# Patient Record
Sex: FEMALE | Race: BLACK OR AFRICAN AMERICAN | NOT HISPANIC OR LATINO | URBAN - METROPOLITAN AREA
[De-identification: names, ages, dates, MRNs, and addresses within clinical notes are randomized per-mention and may not be internally consistent; named-entity substitution may affect disease eponyms.]

---

## 2017-09-27 ENCOUNTER — EMERGENCY (EMERGENCY)
Facility: HOSPITAL | Age: 27
LOS: 1 days | Discharge: HOME | End: 2017-09-27
Attending: EMERGENCY MEDICINE | Admitting: EMERGENCY MEDICINE
Payer: COMMERCIAL

## 2017-09-27 VITALS
HEART RATE: 87 BPM | DIASTOLIC BLOOD PRESSURE: 73 MMHG | TEMPERATURE: 98 F | OXYGEN SATURATION: 100 % | SYSTOLIC BLOOD PRESSURE: 134 MMHG

## 2017-09-27 LAB
ALBUMIN SERPL ELPH-MCNC: 4.2 G/DL — SIGNIFICANT CHANGE UP (ref 3.3–5)
ALP SERPL-CCNC: 62 U/L — SIGNIFICANT CHANGE UP (ref 40–120)
ALT FLD-CCNC: 10 U/L — SIGNIFICANT CHANGE UP (ref 4–33)
AST SERPL-CCNC: 16 U/L — SIGNIFICANT CHANGE UP (ref 4–32)
BASOPHILS # BLD AUTO: 0.04 K/UL — SIGNIFICANT CHANGE UP (ref 0–0.2)
BASOPHILS NFR BLD AUTO: 0.4 % — SIGNIFICANT CHANGE UP (ref 0–2)
BILIRUB SERPL-MCNC: 0.3 MG/DL — SIGNIFICANT CHANGE UP (ref 0.2–1.2)
BUN SERPL-MCNC: 9 MG/DL — SIGNIFICANT CHANGE UP (ref 7–23)
CALCIUM SERPL-MCNC: 8.6 MG/DL — SIGNIFICANT CHANGE UP (ref 8.4–10.5)
CHLORIDE SERPL-SCNC: 103 MMOL/L — SIGNIFICANT CHANGE UP (ref 98–107)
CO2 SERPL-SCNC: 23 MMOL/L — SIGNIFICANT CHANGE UP (ref 22–31)
CREAT SERPL-MCNC: 0.81 MG/DL — SIGNIFICANT CHANGE UP (ref 0.5–1.3)
EOSINOPHIL # BLD AUTO: 0.28 K/UL — SIGNIFICANT CHANGE UP (ref 0–0.5)
EOSINOPHIL NFR BLD AUTO: 3 % — SIGNIFICANT CHANGE UP (ref 0–6)
GLUCOSE SERPL-MCNC: 91 MG/DL — SIGNIFICANT CHANGE UP (ref 70–99)
HCT VFR BLD CALC: 36.8 % — SIGNIFICANT CHANGE UP (ref 34.5–45)
HGB BLD-MCNC: 11.7 G/DL — SIGNIFICANT CHANGE UP (ref 11.5–15.5)
IMM GRANULOCYTES # BLD AUTO: 0.03 # — SIGNIFICANT CHANGE UP
IMM GRANULOCYTES NFR BLD AUTO: 0.3 % — SIGNIFICANT CHANGE UP (ref 0–1.5)
INR BLD: 0.99 — SIGNIFICANT CHANGE UP (ref 0.88–1.17)
LYMPHOCYTES # BLD AUTO: 3.41 K/UL — HIGH (ref 1–3.3)
LYMPHOCYTES # BLD AUTO: 36.5 % — SIGNIFICANT CHANGE UP (ref 13–44)
MCHC RBC-ENTMCNC: 25.8 PG — LOW (ref 27–34)
MCHC RBC-ENTMCNC: 31.8 % — LOW (ref 32–36)
MCV RBC AUTO: 81.2 FL — SIGNIFICANT CHANGE UP (ref 80–100)
MONOCYTES # BLD AUTO: 0.53 K/UL — SIGNIFICANT CHANGE UP (ref 0–0.9)
MONOCYTES NFR BLD AUTO: 5.7 % — SIGNIFICANT CHANGE UP (ref 2–14)
NEUTROPHILS # BLD AUTO: 5.06 K/UL — SIGNIFICANT CHANGE UP (ref 1.8–7.4)
NEUTROPHILS NFR BLD AUTO: 54.1 % — SIGNIFICANT CHANGE UP (ref 43–77)
NRBC # FLD: 0 — SIGNIFICANT CHANGE UP
PLATELET # BLD AUTO: 319 K/UL — SIGNIFICANT CHANGE UP (ref 150–400)
PMV BLD: 9.2 FL — SIGNIFICANT CHANGE UP (ref 7–13)
POTASSIUM SERPL-MCNC: 4.2 MMOL/L — SIGNIFICANT CHANGE UP (ref 3.5–5.3)
POTASSIUM SERPL-SCNC: 4.2 MMOL/L — SIGNIFICANT CHANGE UP (ref 3.5–5.3)
PROT SERPL-MCNC: 7.6 G/DL — SIGNIFICANT CHANGE UP (ref 6–8.3)
PROTHROM AB SERPL-ACNC: 11.1 SEC — SIGNIFICANT CHANGE UP (ref 9.8–13.1)
RBC # BLD: 4.53 M/UL — SIGNIFICANT CHANGE UP (ref 3.8–5.2)
RBC # FLD: 15.9 % — HIGH (ref 10.3–14.5)
SODIUM SERPL-SCNC: 138 MMOL/L — SIGNIFICANT CHANGE UP (ref 135–145)
WBC # BLD: 9.35 K/UL — SIGNIFICANT CHANGE UP (ref 3.8–10.5)
WBC # FLD AUTO: 9.35 K/UL — SIGNIFICANT CHANGE UP (ref 3.8–10.5)

## 2017-09-27 PROCEDURE — 71275 CT ANGIOGRAPHY CHEST: CPT | Mod: 26

## 2017-09-27 PROCEDURE — 99284 EMERGENCY DEPT VISIT MOD MDM: CPT | Mod: 25

## 2017-09-27 PROCEDURE — 93010 ELECTROCARDIOGRAM REPORT: CPT

## 2017-09-27 RX ORDER — KETOROLAC TROMETHAMINE 30 MG/ML
30 SYRINGE (ML) INJECTION ONCE
Qty: 0 | Refills: 0 | Status: DISCONTINUED | OUTPATIENT
Start: 2017-09-27 | End: 2017-09-27

## 2017-09-27 RX ADMIN — Medication 30 MILLIGRAM(S): at 23:00

## 2017-09-27 NOTE — ED PROVIDER NOTE - PMH
Asthma    Fibroids    Menorrhagia    Obesity    Pulmonary embolism  March 2014 -- treated with Xarelto--thought to be due to birth control  Seasonal allergies

## 2017-09-27 NOTE — ED PROVIDER NOTE - MEDICAL DECISION MAKING DETAILS
26 y/o female w/ pleuritic mid back pain, similar feeling to previous PE- labs, CTA chest, pain control

## 2017-09-27 NOTE — ED ADULT TRIAGE NOTE - CHIEF COMPLAINT QUOTE
Patient c/o pain in her back in two areas, the left mid back and right upper spine area. Ceci started yesterday. She has a h/o a PE in 2015.  Chest pain that comes and goes, denies any SOB.

## 2017-09-27 NOTE — ED PROVIDER NOTE - CROS ED ROS STATEMENT
Good fm.  Denies ctx, vb, lof.  Glucola and cbc on rtc. PTL prec given.  
all other ROS negative except as per HPI

## 2017-09-27 NOTE — ED ADULT NURSE NOTE - OBJECTIVE STATEMENT
pt c.o of having lower bacl pain that radiates to her chest. denies chest pain but admits to having HOUSER. no sob at the moment. iv started ,labs sent . pt nad.pt placed on cm.  will continue to monitor.

## 2017-09-27 NOTE — ED PROVIDER NOTE - OBJECTIVE STATEMENT
26 y/o female pmh unprovoked PE(2014, treated with AC for 1 year), c/o upper mid back pain x1 day. Pt admits to sharp upper mid back pain, worse with deep breathing. Pt admits to feeling similar to when she had PE in 2014. Pt denies chest pain, sob, abd pain, n/v/d, numbness, tingling, weakness, dizziness, syncope, fever or chills. Admits to recent flight to and from [place] x2 weeks ago. Denies leg swelling or calf pain/tenderness. Denies recent fall, trauma, injury, exercise or heavy lifting

## 2017-09-27 NOTE — ED PROVIDER NOTE - ATTENDING CONTRIBUTION TO CARE
26 y/o F with h/o PE (2015, completed full year of anticoagulation) here with 1 day of diffuse back pain.  Pt reports pain to right upper back that radiated to left side and around to front of chest.  Pain is worse with any movement and deep inspiration, similar in quality to previous PE.  No fever, chills, palpitations, abd pain, n/v, urinary sxs, leg pain or swelling.  Well appearing, lying comfortably in stretcher, awake and alert, nontoxic.  VSS.  Lungs cta bl.  Cards nl S1/S2, RRR, no MRG.  Abd soft ntnd.  No midline tenderness.  No pedal edema or calf tenderness.  Plan for labs, ucg, cta, r/o PE given hx of same in otherwise unprovoked state, low risk acs heart score 0, will obtain ekg, ce x 1 given 1 day of pain.

## 2018-01-26 ENCOUNTER — RESULT REVIEW (OUTPATIENT)
Age: 28
End: 2018-01-26

## 2020-09-28 ENCOUNTER — OUTPATIENT (OUTPATIENT)
Dept: OUTPATIENT SERVICES | Facility: HOSPITAL | Age: 30
LOS: 1 days | End: 2020-09-28

## 2020-09-28 VITALS
SYSTOLIC BLOOD PRESSURE: 110 MMHG | HEART RATE: 97 BPM | WEIGHT: 270.07 LBS | OXYGEN SATURATION: 98 % | TEMPERATURE: 99 F | DIASTOLIC BLOOD PRESSURE: 70 MMHG | HEIGHT: 68 IN | RESPIRATION RATE: 15 BRPM

## 2020-09-28 DIAGNOSIS — Z98.890 OTHER SPECIFIED POSTPROCEDURAL STATES: Chronic | ICD-10-CM

## 2020-09-28 DIAGNOSIS — D25.1 INTRAMURAL LEIOMYOMA OF UTERUS: ICD-10-CM

## 2020-09-28 DIAGNOSIS — N83.209 UNSPECIFIED OVARIAN CYST, UNSPECIFIED SIDE: ICD-10-CM

## 2020-09-28 DIAGNOSIS — I26.99 OTHER PULMONARY EMBOLISM WITHOUT ACUTE COR PULMONALE: ICD-10-CM

## 2020-09-28 LAB
BLD GP AB SCN SERPL QL: NEGATIVE — SIGNIFICANT CHANGE UP
HCT VFR BLD CALC: 38.8 % — SIGNIFICANT CHANGE UP (ref 34.5–45)
HGB BLD-MCNC: 12.3 G/DL — SIGNIFICANT CHANGE UP (ref 11.5–15.5)
MCHC RBC-ENTMCNC: 27.3 PG — SIGNIFICANT CHANGE UP (ref 27–34)
MCHC RBC-ENTMCNC: 31.7 % — LOW (ref 32–36)
MCV RBC AUTO: 86 FL — SIGNIFICANT CHANGE UP (ref 80–100)
NRBC # FLD: 0 K/UL — SIGNIFICANT CHANGE UP (ref 0–0)
PLATELET # BLD AUTO: 338 K/UL — SIGNIFICANT CHANGE UP (ref 150–400)
PMV BLD: 9.8 FL — SIGNIFICANT CHANGE UP (ref 7–13)
RBC # BLD: 4.51 M/UL — SIGNIFICANT CHANGE UP (ref 3.8–5.2)
RBC # FLD: 13.7 % — SIGNIFICANT CHANGE UP (ref 10.3–14.5)
RH IG SCN BLD-IMP: POSITIVE — SIGNIFICANT CHANGE UP
WBC # BLD: 6.18 K/UL — SIGNIFICANT CHANGE UP (ref 3.8–10.5)
WBC # FLD AUTO: 6.18 K/UL — SIGNIFICANT CHANGE UP (ref 3.8–10.5)

## 2020-09-28 RX ORDER — SODIUM CHLORIDE 9 MG/ML
1000 INJECTION, SOLUTION INTRAVENOUS
Refills: 0 | Status: DISCONTINUED | OUTPATIENT
Start: 2020-10-08 | End: 2020-10-09

## 2020-09-28 NOTE — H&P PST ADULT - NSICDXPASTMEDICALHX_GEN_ALL_CORE_FT
PAST MEDICAL HISTORY:  Asthma     Fibroids     Menorrhagia     Obesity     Pulmonary embolism March 2014 -- treated with Xarelto- 1 year -thought to be due to birth control/  Followed by Hematologist    Seasonal allergies      PAST MEDICAL HISTORY:  Asthma     Fibroids     Menorrhagia     Obesity     Pulmonary embolism March 2014 -- treated with Xarelto- 1 year -thought to be due to birth control/  Followed by Hematologist    Seasonal allergies     Seasonal allergies enviornmental

## 2020-09-28 NOTE — H&P PST ADULT - ATTENDING COMMENTS
Patient was seen and evaluated immediately preop. Surgery planned: abdominal WILLIAMS, BS, possible right oophorectomy due to adhesions noted on MRI. Informed consent signed: Risks of surgery reviewed with patient: pain, bleeding, infection VTE, infertility, injury to adjacent organs and vessels. Patient accepts blood transfusion if needed. She is at increased risk for DVT due to hx of DVT in past and on prophylaxis. S/p hematology consult.   Recovery and follow up reviewed. Questions answered.  Plan: to OR.  MD coy

## 2020-09-28 NOTE — H&P PST ADULT - GENITOURINARY COMMENTS
hx of heavy menstruation since June 2020.  Dx with uterine fibroids  Scheduled for Abdominal supracervical hysterectomy BSO, possible ovarian cystectomy. hx of heavy menstruation since June 2020.  Dx with uterine fibroids

## 2020-09-28 NOTE — H&P PST ADULT - NSICDXPASTSURGICALHX_GEN_ALL_CORE_FT
PAST SURGICAL HISTORY:  No significant past surgical history      PAST SURGICAL HISTORY:  H/O myomectomy 2016

## 2020-09-28 NOTE — H&P PST ADULT - NSANTHOSAYNRD_GEN_A_CORE
No. MONIE screening performed.  STOP BANG Legend: 0-2 = LOW Risk; 3-4 = INTERMEDIATE Risk; 5-8 = HIGH Risk

## 2020-09-28 NOTE — H&P PST ADULT - ASSESSMENT
31 y/o female with hx of heavy menstruation since June 2020.  Dx with uterine fibroids  Scheduled for Abdominal supracervical hysterectomy BSO, possible ovarian cystectomy. 29 y/o female with hx of heavy menstruation since June 2020.  Dx with uterine fibroids  Scheduled for Abdominal supracervical hysterectomy bilateral salpingectomy , possible ovarian cystectomy.

## 2020-09-28 NOTE — H&P PST ADULT - HISTORY OF PRESENT ILLNESS
31 y/o female with hx of heavy menstruation since June 2020.  Dx with uterine fibroids  Scheduled for Abdominal supracervical hysterectomy BSO, possible ovarian cystectomy. 29 y/o female with hx of heavy menstruation since June 2020.  Dx with uterine fibroids  Scheduled for Abdominal supracervical hysterectomy bilateral salpingectomy,  possible ovarian cystectomy.

## 2020-09-28 NOTE — H&P PST ADULT - NSICDXPROBLEM_GEN_ALL_CORE_FT
PROBLEM DIAGNOSES  Problem: Leiomyoma, intramural  Assessment and Plan: Abdominal supracervical hysterectomy BSO, possible ovarian cystectomy.  CBC BMP PT/PTT on chart, T&S done at PST  Covid test scheduled  10/5/20      Problem: Pulmonary thromboembolism  Assessment and Plan: Hx of EP, follwoed by Hematologist. was katherine for pre-op eval. requested on chartt. She was  dvised to start Lovenox 10/5/20, last dose 10/7/20.         PROBLEM DIAGNOSES  Problem: Leiomyoma, intramural  Assessment and Plan: Abdominal supracervical hysterectomy bilateral salpingectomy possible ovarian cystectomy.  CBC BMP PT/PTT on chart, T&S done at PST  Covid test scheduled  10/5/20      Problem: Pulmonary thromboembolism  Assessment and Plan: Hx of EP, follwoed by Hematologist. was katherine for pre-op eval. requested on chartt. She was  dvised to start Lovenox 10/5/20, last dose 10/7/20.

## 2020-09-28 NOTE — H&P PST ADULT - VTE RISK COMMENTS
Pt had DVT 2016 after myomectomy.  Followed by Hematologist.  Pt was on Xarelto x1 year.  Thought o be due to birth control.  Pt reports she will start Lovenox 10/5/20 per instructions given to her from Hematologist

## 2020-09-28 NOTE — H&P PST ADULT - RESPIRATORY AND THORAX COMMENTS
hx of mild intermittently asthma, on singular and Flonase.   No recent exacerbations, has not used inhaler in >2 years

## 2020-10-04 ENCOUNTER — TRANSCRIPTION ENCOUNTER (OUTPATIENT)
Age: 30
End: 2020-10-04

## 2020-10-04 DIAGNOSIS — Z01.818 ENCOUNTER FOR OTHER PREPROCEDURAL EXAMINATION: ICD-10-CM

## 2020-10-05 ENCOUNTER — APPOINTMENT (OUTPATIENT)
Dept: DISASTER EMERGENCY | Facility: CLINIC | Age: 30
End: 2020-10-05

## 2020-10-05 LAB — SARS-COV-2 N GENE NPH QL NAA+PROBE: NOT DETECTED

## 2020-10-07 ENCOUNTER — TRANSCRIPTION ENCOUNTER (OUTPATIENT)
Age: 30
End: 2020-10-07

## 2020-10-07 NOTE — ASU PATIENT PROFILE, ADULT - PMH
Asthma    Fibroids    Menorrhagia    Obesity    Pulmonary embolism  March 2014 -- treated with Xarelto- 1 year -thought to be due to birth control/  Followed by Hematologist  Seasonal allergies  enviornmental  Seasonal allergies

## 2020-10-08 ENCOUNTER — INPATIENT (INPATIENT)
Facility: HOSPITAL | Age: 30
LOS: 2 days | Discharge: HOME | End: 2020-10-11
Attending: OBSTETRICS & GYNECOLOGY | Admitting: OBSTETRICS & GYNECOLOGY
Payer: MEDICAID

## 2020-10-08 ENCOUNTER — RESULT REVIEW (OUTPATIENT)
Age: 30
End: 2020-10-08

## 2020-10-08 VITALS
DIASTOLIC BLOOD PRESSURE: 62 MMHG | HEART RATE: 96 BPM | SYSTOLIC BLOOD PRESSURE: 116 MMHG | WEIGHT: 270.07 LBS | TEMPERATURE: 98 F | OXYGEN SATURATION: 98 % | HEIGHT: 68 IN | RESPIRATION RATE: 16 BRPM

## 2020-10-08 DIAGNOSIS — Z98.890 OTHER SPECIFIED POSTPROCEDURAL STATES: Chronic | ICD-10-CM

## 2020-10-08 DIAGNOSIS — N83.209 UNSPECIFIED OVARIAN CYST, UNSPECIFIED SIDE: ICD-10-CM

## 2020-10-08 LAB — HCG UR QL: NEGATIVE — SIGNIFICANT CHANGE UP

## 2020-10-08 PROCEDURE — 58180 PARTIAL HYSTERECTOMY: CPT

## 2020-10-08 PROCEDURE — 88307 TISSUE EXAM BY PATHOLOGIST: CPT | Mod: 26

## 2020-10-08 PROCEDURE — 88305 TISSUE EXAM BY PATHOLOGIST: CPT | Mod: 26

## 2020-10-08 RX ORDER — ACETAMINOPHEN 500 MG
975 TABLET ORAL EVERY 6 HOURS
Refills: 0 | Status: DISCONTINUED | OUTPATIENT
Start: 2020-10-08 | End: 2020-10-11

## 2020-10-08 RX ORDER — OXYCODONE HYDROCHLORIDE 5 MG/1
5 TABLET ORAL EVERY 6 HOURS
Refills: 0 | Status: DISCONTINUED | OUTPATIENT
Start: 2020-10-08 | End: 2020-10-11

## 2020-10-08 RX ORDER — ENOXAPARIN SODIUM 100 MG/ML
60 INJECTION SUBCUTANEOUS DAILY
Refills: 0 | Status: DISCONTINUED | OUTPATIENT
Start: 2020-10-08 | End: 2020-10-11

## 2020-10-08 RX ORDER — FLUTICASONE PROPIONATE 50 MCG
1 SPRAY, SUSPENSION NASAL
Qty: 0 | Refills: 0 | DISCHARGE

## 2020-10-08 RX ORDER — KETOROLAC TROMETHAMINE 30 MG/ML
30 SYRINGE (ML) INJECTION EVERY 6 HOURS
Refills: 0 | Status: DISCONTINUED | OUTPATIENT
Start: 2020-10-08 | End: 2020-10-09

## 2020-10-08 RX ORDER — FENTANYL CITRATE 50 UG/ML
25 INJECTION INTRAVENOUS
Refills: 0 | Status: DISCONTINUED | OUTPATIENT
Start: 2020-10-08 | End: 2020-10-08

## 2020-10-08 RX ORDER — ONDANSETRON 8 MG/1
4 TABLET, FILM COATED ORAL ONCE
Refills: 0 | Status: DISCONTINUED | OUTPATIENT
Start: 2020-10-08 | End: 2020-10-08

## 2020-10-08 RX ORDER — HYDROMORPHONE HYDROCHLORIDE 2 MG/ML
0.5 INJECTION INTRAMUSCULAR; INTRAVENOUS; SUBCUTANEOUS
Refills: 0 | Status: DISCONTINUED | OUTPATIENT
Start: 2020-10-08 | End: 2020-10-08

## 2020-10-08 RX ORDER — OXYCODONE HYDROCHLORIDE 5 MG/1
5 TABLET ORAL ONCE
Refills: 0 | Status: DISCONTINUED | OUTPATIENT
Start: 2020-10-08 | End: 2020-10-08

## 2020-10-08 RX ORDER — INFLUENZA VIRUS VACCINE 15; 15; 15; 15 UG/.5ML; UG/.5ML; UG/.5ML; UG/.5ML
0.5 SUSPENSION INTRAMUSCULAR ONCE
Refills: 0 | Status: DISCONTINUED | OUTPATIENT
Start: 2020-10-08 | End: 2020-10-11

## 2020-10-08 RX ORDER — MELOXICAM 15 MG/1
1 TABLET ORAL
Qty: 0 | Refills: 0 | DISCHARGE

## 2020-10-08 RX ORDER — SODIUM CHLORIDE 9 MG/ML
1000 INJECTION, SOLUTION INTRAVENOUS
Refills: 0 | Status: DISCONTINUED | OUTPATIENT
Start: 2020-10-08 | End: 2020-10-09

## 2020-10-08 RX ORDER — MONTELUKAST 4 MG/1
1 TABLET, CHEWABLE ORAL
Qty: 0 | Refills: 0 | DISCHARGE

## 2020-10-08 RX ORDER — ALBUTEROL 90 UG/1
2 AEROSOL, METERED ORAL EVERY 6 HOURS
Refills: 0 | Status: DISCONTINUED | OUTPATIENT
Start: 2020-10-08 | End: 2020-10-11

## 2020-10-08 RX ADMIN — HYDROMORPHONE HYDROCHLORIDE 0.5 MILLIGRAM(S): 2 INJECTION INTRAMUSCULAR; INTRAVENOUS; SUBCUTANEOUS at 19:45

## 2020-10-08 RX ADMIN — Medication 975 MILLIGRAM(S): at 23:45

## 2020-10-08 RX ADMIN — SODIUM CHLORIDE 125 MILLILITER(S): 9 INJECTION, SOLUTION INTRAVENOUS at 23:09

## 2020-10-08 RX ADMIN — OXYCODONE HYDROCHLORIDE 5 MILLIGRAM(S): 5 TABLET ORAL at 23:09

## 2020-10-08 RX ADMIN — OXYCODONE HYDROCHLORIDE 5 MILLIGRAM(S): 5 TABLET ORAL at 21:40

## 2020-10-08 RX ADMIN — OXYCODONE HYDROCHLORIDE 5 MILLIGRAM(S): 5 TABLET ORAL at 19:50

## 2020-10-08 RX ADMIN — Medication 30 MILLIGRAM(S): at 23:45

## 2020-10-08 RX ADMIN — OXYCODONE HYDROCHLORIDE 5 MILLIGRAM(S): 5 TABLET ORAL at 23:45

## 2020-10-08 RX ADMIN — HYDROMORPHONE HYDROCHLORIDE 0.5 MILLIGRAM(S): 2 INJECTION INTRAMUSCULAR; INTRAVENOUS; SUBCUTANEOUS at 19:29

## 2020-10-08 RX ADMIN — Medication 975 MILLIGRAM(S): at 23:09

## 2020-10-08 RX ADMIN — SODIUM CHLORIDE 125 MILLILITER(S): 9 INJECTION, SOLUTION INTRAVENOUS at 19:32

## 2020-10-08 RX ADMIN — Medication 30 MILLIGRAM(S): at 23:10

## 2020-10-08 NOTE — BRIEF OPERATIVE NOTE - NSICDXBRIEFPOSTOP_GEN_ALL_CORE_FT
POST-OP DIAGNOSIS:  Leiomyoma uteri 08-Oct-2020 20:22:01  Tommy Culver  
POST-OP DIAGNOSIS:  Leiomyoma uteri 08-Oct-2020 20:22:01  Tommy Culver

## 2020-10-08 NOTE — BRIEF OPERATIVE NOTE - NSICDXBRIEFPREOP_GEN_ALL_CORE_FT
PRE-OP DIAGNOSIS:  Leiomyoma 28-Sep-2020 09:37:20  Vivi Yuan  
PRE-OP DIAGNOSIS:  Leiomyoma 28-Sep-2020 09:37:20  Vivi Yuan

## 2020-10-08 NOTE — BRIEF OPERATIVE NOTE - OPERATION/FINDINGS
Minor adhesiolysis (15min) of the sigmoid colon-to-fibroid adhesions. No colotomies noted. Turned case over to Gyn-Onc upon their arrival to help Dr. Antoine.
Called as intra-operative consultation after vertical midline incision made and Dr. Peterson from Gen Surg assisted with lysis of adhesions  Myomatous uterus with remaining adhesions to sigmoid colon and mesentery  Lysis of adhesions performed and as patient had consented with Dr. Antoine for supracervical hysterectomy this previously discussed procedure was performed  After removal of uterus and fallopian tubes colon was oversewed with 3-0 silk sutures in 2 areas.  Remainder of procedure turned over to Dr. Antoine
fibroid uterus ~ 20w in size with bowel adherent to posterior wall of uterus and uterine fundus, and to R ovary. General Surgery and GYN ONC called in to assist with extensive lysis of adhesions. MARY, colon oversewn in 2 locations. endocervix removed after Supracervical Hysterectomy, and remaining ectocervix reapproximated.

## 2020-10-08 NOTE — CHART NOTE - NSCHARTNOTEFT_GEN_A_CORE
R2 GYN POST OP CHECK    Patient seen and examined at bedside, recently post-op.  Pt is having some back pain at this time and would like pain medication. Denies CP, SOB, N/V, fevers, and chills.     Vital Signs Last 24 Hours  T(C): 37.2 (10-08-20 @ 21:35), Max: 37.2 (10-08-20 @ 21:35)  HR: 82 (10-08-20 @ 21:35) (80 - 96)  BP: 137/88 (10-08-20 @ 21:35) (116/62 - 149/89)  RR: 18 (10-08-20 @ 21:35) (12 - 22)  SpO2: 100% (10-08-20 @ 21:35) (97% - 100%)    I&O's Summary    08 Oct 2020 07:01  -  08 Oct 2020 23:04  --------------------------------------------------------  IN: 250 mL / OUT: 200 mL / NET: 50 mL        Physical Exam:  General: NAD  CV: NR, RR, S1, S2, no M/R/G  Lungs: CTA-B  Abdomen: Soft, appropriately tender, non-distended, +BS  Incision: vertical midline CDI  Ext: No pain or swelling    Labs:      MEDICATIONS  (STANDING):  acetaminophen   Tablet .. 975 milliGRAM(s) Oral every 6 hours  enoxaparin Injectable 60 milliGRAM(s) SubCutaneous daily  influenza   Vaccine 0.5 milliLiter(s) IntraMuscular once  ketorolac   Injectable 30 milliGRAM(s) IV Push every 6 hours  lactated ringers. 1000 milliLiter(s) (30 mL/Hr) IV Continuous <Continuous>  lactated ringers. 1000 milliLiter(s) (125 mL/Hr) IV Continuous <Continuous>    MEDICATIONS  (PRN):  ALBUTerol    90 MICROgram(s) HFA Inhaler 2 Puff(s) Inhalation every 6 hours PRN Shortness of Breath and/or Wheezing  oxyCODONE    IR 5 milliGRAM(s) Oral every 6 hours PRN Severe Pain (7 - 10)      30yoF s/p Abdominal supracervial hysterectomy, BS, MARY currently doing well post-operatively  Neuro: c/w Tylenol, Toradol, Oxycodone  CV: VSS, CBC in AM  Pulm: Saturating well on room air, encourage incentive spirometry  GI: Clear liquid diet  : UOP adequate, c/w wellington until POD#1  Heme: Lovenox and SCDs for DVT ppx  Dispo: Continue routine post-op care      Cirilo Mercedes PGY2

## 2020-10-08 NOTE — BRIEF OPERATIVE NOTE - NSICDXBRIEFPROCEDURE_GEN_ALL_CORE_FT
PROCEDURES:  Lysis of adhesions, pelvic 08-Oct-2020 16:31:32  Wallace Peterson  
PROCEDURES:  Lysis of pelvic adhesions 08-Oct-2020 20:21:44  Tommy Culver  Bilateral salpingectomy 08-Oct-2020 20:21:35  Tommy Culver  Supracervical abdominal hysterectomy 08-Oct-2020 20:21:20  Tommy Culver  
PROCEDURES:  Lysis of pelvic adhesions 08-Oct-2020 20:21:44  Tommy Culver  Bilateral salpingectomy 08-Oct-2020 20:21:35  Tommy Culver  Supracervical abdominal hysterectomy 08-Oct-2020 20:21:20  Tommy Culver

## 2020-10-09 ENCOUNTER — TRANSCRIPTION ENCOUNTER (OUTPATIENT)
Age: 30
End: 2020-10-09

## 2020-10-09 DIAGNOSIS — Z98.890 OTHER SPECIFIED POSTPROCEDURAL STATES: ICD-10-CM

## 2020-10-09 DIAGNOSIS — J30.2 OTHER SEASONAL ALLERGIC RHINITIS: ICD-10-CM

## 2020-10-09 DIAGNOSIS — J45.909 UNSPECIFIED ASTHMA, UNCOMPLICATED: ICD-10-CM

## 2020-10-09 DIAGNOSIS — I26.99 OTHER PULMONARY EMBOLISM WITHOUT ACUTE COR PULMONALE: ICD-10-CM

## 2020-10-09 DIAGNOSIS — Z29.9 ENCOUNTER FOR PROPHYLACTIC MEASURES, UNSPECIFIED: ICD-10-CM

## 2020-10-09 LAB
ANION GAP SERPL CALC-SCNC: 7 MMO/L — SIGNIFICANT CHANGE UP (ref 7–14)
BUN SERPL-MCNC: 6 MG/DL — LOW (ref 7–23)
CALCIUM SERPL-MCNC: 8.2 MG/DL — LOW (ref 8.4–10.5)
CHLORIDE SERPL-SCNC: 104 MMOL/L — SIGNIFICANT CHANGE UP (ref 98–107)
CO2 SERPL-SCNC: 26 MMOL/L — SIGNIFICANT CHANGE UP (ref 22–31)
CREAT SERPL-MCNC: 0.82 MG/DL — SIGNIFICANT CHANGE UP (ref 0.5–1.3)
GLUCOSE SERPL-MCNC: 123 MG/DL — HIGH (ref 70–99)
HCT VFR BLD CALC: 35.5 % — SIGNIFICANT CHANGE UP (ref 34.5–45)
HGB BLD-MCNC: 10.8 G/DL — LOW (ref 11.5–15.5)
MCHC RBC-ENTMCNC: 26.5 PG — LOW (ref 27–34)
MCHC RBC-ENTMCNC: 30.4 % — LOW (ref 32–36)
MCV RBC AUTO: 87 FL — SIGNIFICANT CHANGE UP (ref 80–100)
NRBC # FLD: 0 K/UL — SIGNIFICANT CHANGE UP (ref 0–0)
PLATELET # BLD AUTO: 299 K/UL — SIGNIFICANT CHANGE UP (ref 150–400)
PMV BLD: 9.8 FL — SIGNIFICANT CHANGE UP (ref 7–13)
POTASSIUM SERPL-MCNC: 4.7 MMOL/L — SIGNIFICANT CHANGE UP (ref 3.5–5.3)
POTASSIUM SERPL-SCNC: 4.7 MMOL/L — SIGNIFICANT CHANGE UP (ref 3.5–5.3)
RBC # BLD: 4.08 M/UL — SIGNIFICANT CHANGE UP (ref 3.8–5.2)
RBC # FLD: 13.4 % — SIGNIFICANT CHANGE UP (ref 10.3–14.5)
SODIUM SERPL-SCNC: 137 MMOL/L — SIGNIFICANT CHANGE UP (ref 135–145)
WBC # BLD: 11.74 K/UL — HIGH (ref 3.8–10.5)
WBC # FLD AUTO: 11.74 K/UL — HIGH (ref 3.8–10.5)

## 2020-10-09 PROCEDURE — 99223 1ST HOSP IP/OBS HIGH 75: CPT

## 2020-10-09 RX ORDER — IBUPROFEN 200 MG
1 TABLET ORAL
Qty: 0 | Refills: 0 | DISCHARGE
Start: 2020-10-09

## 2020-10-09 RX ORDER — ENOXAPARIN SODIUM 100 MG/ML
60 INJECTION SUBCUTANEOUS
Qty: 0 | Refills: 0 | DISCHARGE
Start: 2020-10-09

## 2020-10-09 RX ORDER — ACETAMINOPHEN 500 MG
3 TABLET ORAL
Qty: 0 | Refills: 0 | DISCHARGE
Start: 2020-10-09

## 2020-10-09 RX ORDER — FLUTICASONE PROPIONATE 50 MCG
1 SPRAY, SUSPENSION NASAL
Refills: 0 | Status: DISCONTINUED | OUTPATIENT
Start: 2020-10-09 | End: 2020-10-11

## 2020-10-09 RX ORDER — IBUPROFEN 200 MG
600 TABLET ORAL EVERY 6 HOURS
Refills: 0 | Status: DISCONTINUED | OUTPATIENT
Start: 2020-10-09 | End: 2020-10-11

## 2020-10-09 RX ORDER — MONTELUKAST 4 MG/1
10 TABLET, CHEWABLE ORAL DAILY
Refills: 0 | Status: DISCONTINUED | OUTPATIENT
Start: 2020-10-09 | End: 2020-10-11

## 2020-10-09 RX ORDER — OXYCODONE HYDROCHLORIDE 5 MG/1
1 TABLET ORAL
Qty: 6 | Refills: 0
Start: 2020-10-09

## 2020-10-09 RX ADMIN — Medication 975 MILLIGRAM(S): at 17:57

## 2020-10-09 RX ADMIN — Medication 975 MILLIGRAM(S): at 06:15

## 2020-10-09 RX ADMIN — Medication 975 MILLIGRAM(S): at 12:12

## 2020-10-09 RX ADMIN — MONTELUKAST 10 MILLIGRAM(S): 4 TABLET, CHEWABLE ORAL at 12:21

## 2020-10-09 RX ADMIN — ENOXAPARIN SODIUM 60 MILLIGRAM(S): 100 INJECTION SUBCUTANEOUS at 04:39

## 2020-10-09 RX ADMIN — OXYCODONE HYDROCHLORIDE 5 MILLIGRAM(S): 5 TABLET ORAL at 21:20

## 2020-10-09 RX ADMIN — Medication 600 MILLIGRAM(S): at 12:35

## 2020-10-09 RX ADMIN — Medication 975 MILLIGRAM(S): at 23:29

## 2020-10-09 RX ADMIN — Medication 600 MILLIGRAM(S): at 12:12

## 2020-10-09 RX ADMIN — OXYCODONE HYDROCHLORIDE 5 MILLIGRAM(S): 5 TABLET ORAL at 19:46

## 2020-10-09 RX ADMIN — OXYCODONE HYDROCHLORIDE 5 MILLIGRAM(S): 5 TABLET ORAL at 12:12

## 2020-10-09 RX ADMIN — OXYCODONE HYDROCHLORIDE 5 MILLIGRAM(S): 5 TABLET ORAL at 12:35

## 2020-10-09 RX ADMIN — OXYCODONE HYDROCHLORIDE 5 MILLIGRAM(S): 5 TABLET ORAL at 05:39

## 2020-10-09 RX ADMIN — Medication 1 SPRAY(S): at 17:57

## 2020-10-09 RX ADMIN — Medication 30 MILLIGRAM(S): at 05:40

## 2020-10-09 RX ADMIN — Medication 975 MILLIGRAM(S): at 18:54

## 2020-10-09 RX ADMIN — Medication 975 MILLIGRAM(S): at 05:40

## 2020-10-09 RX ADMIN — OXYCODONE HYDROCHLORIDE 5 MILLIGRAM(S): 5 TABLET ORAL at 06:15

## 2020-10-09 RX ADMIN — Medication 600 MILLIGRAM(S): at 18:54

## 2020-10-09 RX ADMIN — Medication 975 MILLIGRAM(S): at 12:35

## 2020-10-09 RX ADMIN — Medication 30 MILLIGRAM(S): at 06:15

## 2020-10-09 RX ADMIN — Medication 600 MILLIGRAM(S): at 17:57

## 2020-10-09 NOTE — CONSULT NOTE ADULT - PROBLEM SELECTOR RECOMMENDATION 9
POD #1 for Total Abdominal Hysterectomy, BS, MARY.  - Care per primary team   - Diet advanced to regular   - Michael removed this AM, voiding spontaneously   - Awaiting return of bowel function. Monitor for flatus/BM  - Pain control  - Encourage OOB, incentive spirometry POD #1 for Total Abdominal Hysterectomy, BS, MARY. General Surgery and GYN ONC called in to assist with extensive lysis of adhesions. Colon oversewn in 2 locations  - Care per primary team   - Diet advanced to regular   - Michael removed this AM, voiding spontaneously   - Awaiting return of bowel function. Monitor for flatus/BM  - Pain control  - Encourage OOB, incentive spirometry

## 2020-10-09 NOTE — PROGRESS NOTE ADULT - SUBJECTIVE AND OBJECTIVE BOX
GYN attending note  INTERVAL HPI/OVERNIGHT EVENTS: Pt seen and examined at bedside.  Patient feels well. She tolerated clears, had nausea yesterday, not today. no vomiting. No flatus. Michael to gravity. Not ambulating yet. Pain controlled with meds. Denies SOB, palpitations, chest pain or leg pain.  MEDICATIONS  (STANDING):  acetaminophen   Tablet .. 975 milliGRAM(s) Oral every 6 hours  enoxaparin Injectable 60 milliGRAM(s) SubCutaneous daily  influenza   Vaccine 0.5 milliLiter(s) IntraMuscular once  ketorolac   Injectable 30 milliGRAM(s) IV Push every 6 hours  lactated ringers. 1000 milliLiter(s) (30 mL/Hr) IV Continuous <Continuous>  lactated ringers. 1000 milliLiter(s) (125 mL/Hr) IV Continuous <Continuous>    MEDICATIONS  (PRN):  ALBUTerol    90 MICROgram(s) HFA Inhaler 2 Puff(s) Inhalation every 6 hours PRN Shortness of Breath and/or Wheezing  oxyCODONE    IR 5 milliGRAM(s) Oral every 6 hours PRN Severe Pain (7 - 10)      Allergies    No Known Allergies        Vital Signs Last 24 Hrs  T(C): 37.2 (09 Oct 2020 05:36), Max: 37.2 (08 Oct 2020 21:35)  T(F): 99 (09 Oct 2020 05:36), Max: 99 (09 Oct 2020 00:27)  HR: 80 (09 Oct 2020 05:36) (80 - 96)  BP: 128/66 (09 Oct 2020 05:36) (116/62 - 149/89)  BP(mean): 100 (08 Oct 2020 21:35) (89 - 101)  RR: 18 (09 Oct 2020 05:36) (12 - 22)  SpO2: 96% (09 Oct 2020 05:36) (96% - 100%)      PHYSICAL EXAM:    GA: NAD, A+0 x 3  Abd: soft, appropriately tender, nondistended, no guarding,   Incision: clean, dry and intact; steri-strips in place  Michael: draining clear yellow urine  Extremities: no swelling or calf tenderness,      LABS: pending

## 2020-10-09 NOTE — DISCHARGE NOTE PROVIDER - CARE PROVIDER_API CALL
Jessica Antoine  [facility] G[facility]  [address] [place] [address]  Phone: [phone]  Fax: [phone]  Follow Up Time:

## 2020-10-09 NOTE — CONSULT NOTE ADULT - PROBLEM SELECTOR RECOMMENDATION 4
Diagnosed in 3/2014, thought to be provoked in setting of birth controlled. Treated with AC x1 year, now off  - C/w DVT ppx w/ lovenox

## 2020-10-09 NOTE — DISCHARGE NOTE PROVIDER - NSDCCPCAREPLAN_GEN_ALL_CORE_FT
PRINCIPAL DISCHARGE DIAGNOSIS  Diagnosis: Leiomyoma, intramural  Assessment and Plan of Treatment:

## 2020-10-09 NOTE — DISCHARGE NOTE PROVIDER - NSDCMRMEDTOKEN_GEN_ALL_CORE_FT
acetaminophen 325 mg oral tablet: 3 tab(s) orally every 6 hours  enoxaparin: 60 milliliter(s) injectable once a day  fluticasone 50 mcg/inh nasal spray: 1 spray(s) nasal once a day  fluticasone propionate: 1 spray(s) in each nostril once a day in morning  ibuprofen 600 mg oral tablet: 1 tab(s) orally every 6 hours, As needed, Mild Pain (1 - 3), Moderate Pain (4 - 6)  meloxicam 10 mg oral capsule: 1 cap(s) orally once a day. lsat dose 1 week prior to surgery  montelukast 10 mg oral tablet: 1 tab(s) orally once a day  oxyCODONE 5 mg oral tablet: 1 tab(s) orally every 6 hours MDD:4 tabs   ProAir HFA 90 mcg/inh inhalation aerosol: 2 puff(s) inhaled 3 times a day, As Needed

## 2020-10-09 NOTE — CONSULT NOTE ADULT - ASSESSMENT
30F hx of asthma, PE (3/2014 thought to be 2/2 birth control), fibroids complicated by heavy menstruation admitted for planned Total Abdominal Hysterectomy, BS, MARY.

## 2020-10-09 NOTE — CONSULT NOTE ADULT - SUBJECTIVE AND OBJECTIVE BOX
CHIEF COMPLAINT: Patient is a 30y old  Female who presents with a chief complaint of s/p Total Abdominal Hysterectomy, BS	 (09 Oct 2020 10:32)      HPI: HPI:  30F hx of asthma, PE (3/2014 thought to be 2/2 birth control), fibroids complicated by heavy menstruation admitted for planned Total Abdominal Hysterectomy, MARY ARIAS. This AM, patient states that she feels very lightheaded when she has to sit-up or stand up. She denies headaches, syncope, room-spinning. She states she tolerated CLD without nausea, vomiting. She feels hungry and wants to try regular food. Her wellington was removed this and AM and she voided spontaneously. She has not passed flatus or had a BM yet. She noticed some blood oozing from incision site, denies vaginal bleeding. Denies chest pain or SOB. Denies wheezing. States she uses her albuterol very rarely.     Allergies: No Known Allergies    HOME MEDICATIONS: [x] Reviewed  Montelukast 10mg daily  Fluticasone 50 mcg/inh nasal spray    MEDICATIONS  (STANDING):  acetaminophen   Tablet .. 975 milliGRAM(s) Oral every 6 hours  enoxaparin Injectable 60 milliGRAM(s) SubCutaneous daily  fluticasone propionate 50 MICROgram(s)/spray Nasal Spray 1 Spray(s) Both Nostrils two times a day  influenza   Vaccine 0.5 milliLiter(s) IntraMuscular once  lactated ringers. 1000 milliLiter(s) (30 mL/Hr) IV Continuous <Continuous>  lactated ringers. 1000 milliLiter(s) (125 mL/Hr) IV Continuous <Continuous>  montelukast 10 milliGRAM(s) Oral daily    MEDICATIONS  (PRN):  ALBUTerol    90 MICROgram(s) HFA Inhaler 2 Puff(s) Inhalation every 6 hours PRN Shortness of Breath and/or Wheezing  ibuprofen  Tablet. 600 milliGRAM(s) Oral every 6 hours PRN Mild Pain (1 - 3), Moderate Pain (4 - 6)  oxyCODONE    IR 5 milliGRAM(s) Oral every 6 hours PRN Severe Pain (7 - 10)      PAST MEDICAL & SURGICAL HISTORY:  Seasonal allergies  Obesity  Menorrhagia  Pulmonary embolism  March 2014 -- treated with Xarelto- 1 year -thought to be due to birth control/  Followed by Hematologist  Asthma  Fibroids    H/O myomectomy in 2016    [X] Reviewed     SOCIAL HISTORY:  [x] Substance abuse: denies   [x] Tobacco: denies   [x] Alcohol use: denies     FAMILY HISTORY:  Hx of fibroids in mother     REVIEW OF SYSTEMS:    [x] All other ROS negative  [  ] Unable to obtain due to poor mental status    Vital Signs Last 24 Hrs  T(C): 37.2 (09 Oct 2020 05:36), Max: 37.2 (08 Oct 2020 21:35)  T(F): 99 (09 Oct 2020 05:36), Max: 99 (09 Oct 2020 00:27)  HR: 80 (09 Oct 2020 05:36) (80 - 96)  BP: 128/66 (09 Oct 2020 05:36) (116/62 - 149/89)  BP(mean): 100 (08 Oct 2020 21:35) (89 - 101)  RR: 18 (09 Oct 2020 05:36) (12 - 22)  SpO2: 96% (09 Oct 2020 05:36) (96% - 100%)    PHYSICAL EXAM:    GENERAL: NAD, well-groomed, well-developed  HEAD:  Atraumatic, Normocephalic  EYES: conjunctiva and sclera clear  ENMT: Moist mucous membranes  RESPIRATORY: Clear to auscultation bilaterally; No rales, rhonchi, wheezing, or rubs  CARDIOVASCULAR: Regular rate and rhythm; No murmurs, rubs, or gallops  GASTROINTESTINAL: Soft, appropriately tender, Nondistended; Bowel sounds present; vertical midline incision c/d/i  GENITOURINARY: Not examined  NERVOUS SYSTEM:  Alert & Oriented X3; Moving all 4 extremities; No gross sensory deficits  SKIN: No rashes or lesions    LABS:                        10.8   11.74 )-----------( 299      ( 09 Oct 2020 05:50 )             35.5     10-09    137  |  104  |  6<L>  ----------------------------<  123<H>  4.7   |  26  |  0.82    Ca    8.2<L>      09 Oct 2020 05:50              [x] Consultant(s) Notes Reviewed  [x] Care Discussed with Consultants/Other Providers: Gyn Team

## 2020-10-09 NOTE — DISCHARGE NOTE PROVIDER - NSDCFUADDINST_GEN_ALL_CORE_FT
Return to your regular way of eating.  Resume normal activity as tolerated. Complete vaginal rest, no tampons, no douching, no tub bathing, no sexual activities until cleared by MD.  Call your doctor with any signs and symptoms of infection such as fever, chills, nausea or vomiting. Call your doctor if you're unable to tolerate food or have difficulty urinating.  Call your doctor if you have pain that is not relieved by your prescribed medications.  Notify your doctor with any other concerns.  Follow up with Dr. Antoine in 2 weeks.

## 2020-10-09 NOTE — PROGRESS NOTE ADULT - ASSESSMENT
A/P: 31yo POD1 s/p Total Abdominal Hysterectomy, BS, MARY, blocks with GYN ONC and General Surgery. Hemodynamically stable and doing well.  Neuro: c/w po pain meds: motrin, tylenol, oxycodone prn  CV: Hemodynamically stable  Pulm: Saturating well on room air, encourage oob/amb  GI: advance to regular diet  : d/c, DTV  Heme: c/w lovenox and SCDs for DVT ppx  Dispo: Continue routine post-op care    DAPHNE Sheldon PGY3

## 2020-10-09 NOTE — DISCHARGE NOTE PROVIDER - HOSPITAL COURSE
31yo s/p Total Abdominal Hysterectomy, BS 2/2 fibroid uterus. . Please see operative note for further details. On POD1, wellington was discontinued. At time of discharge, patient was ambulating, voiding spontaneously, and tolerating a regular diet. Pain was well controlled. Patient to have close follow up with Dr. Antoine.

## 2020-10-09 NOTE — PROGRESS NOTE ADULT - SUBJECTIVE AND OBJECTIVE BOX
Patient seen and examined at bedside, no acute overnight events. No acute complaints, pain well controlled. Patient is ambulating, passing flatus, voiding spontaneously, and tolerating regular diet. Denies CP, SOB, N/V, fevers, and chills.    Vital Signs Last 24 Hours  T(C): 37.2 (10-09-20 @ 05:36), Max: 37.2 (10-08-20 @ 21:35)  HR: 80 (10-09-20 @ 05:36) (80 - 96)  BP: 128/66 (10-09-20 @ 05:36) (116/62 - 149/89)  RR: 18 (10-09-20 @ 05:36) (12 - 22)  SpO2: 96% (10-09-20 @ 05:36) (96% - 100%)    I&O's Detail    08 Oct 2020 07:01  -  09 Oct 2020 07:00  --------------------------------------------------------  IN:    Lactated Ringers: 1250 mL    Oral Fluid: 720 mL  Total IN: 1970 mL    OUT:    Indwelling Catheter - Urethral (mL): 200 mL    Voided (mL): 900 mL  Total OUT: 1100 mL    Total NET: 870 mL          Physical Exam:  General: NAD  CV: NR, RR, S1, S2, no M/R/G  Lungs: CTA-B  Abdomen: Soft, non-tender, non-distended. No rebound or guarding  Incision: midline vertical C/D/I, steris in place  Ext: No pain or swelling    Labs:             10.8<L>  11.74<H> )-----------( 299      ( 10-09 @ 05:50 )             35.5         MEDICATIONS  (STANDING):  acetaminophen   Tablet .. 975 milliGRAM(s) Oral every 6 hours  enoxaparin Injectable 60 milliGRAM(s) SubCutaneous daily  influenza   Vaccine 0.5 milliLiter(s) IntraMuscular once  ketorolac   Injectable 30 milliGRAM(s) IV Push every 6 hours  lactated ringers. 1000 milliLiter(s) (30 mL/Hr) IV Continuous <Continuous>  lactated ringers. 1000 milliLiter(s) (125 mL/Hr) IV Continuous <Continuous>    MEDICATIONS  (PRN):  ALBUTerol    90 MICROgram(s) HFA Inhaler 2 Puff(s) Inhalation every 6 hours PRN Shortness of Breath and/or Wheezing  oxyCODONE    IR 5 milliGRAM(s) Oral every 6 hours PRN Severe Pain (7 - 10)   Patient seen and examined at bedside, no acute overnight events. No acute complaints, pain well controlled. Patient is not yet ambulating. Awaiting flatus. Michael in place. Tolerating clears diet. Denies CP, SOB, N/V, fevers, and chills.    Vital Signs Last 24 Hours  T(C): 37.2 (10-09-20 @ 05:36), Max: 37.2 (10-08-20 @ 21:35)  HR: 80 (10-09-20 @ 05:36) (80 - 96)  BP: 128/66 (10-09-20 @ 05:36) (116/62 - 149/89)  RR: 18 (10-09-20 @ 05:36) (12 - 22)  SpO2: 96% (10-09-20 @ 05:36) (96% - 100%)    I&O's Detail    08 Oct 2020 07:01  -  09 Oct 2020 07:00  --------------------------------------------------------  IN:    Lactated Ringers: 1250 mL    Oral Fluid: 720 mL  Total IN: 1970 mL    OUT:    Indwelling Catheter - Urethral (mL): 200 mL    Voided (mL): 900 mL  Total OUT: 1100 mL    Total NET: 870 mL          Physical Exam:  General: NAD  CV: NR, RR, S1, S2, no M/R/G  Lungs: CTA-B  Abdomen: Soft, non-tender, non-distended. No rebound or guarding  Incision: midline vertical C/D/I, steris in place  Ext: No pain or swelling    Labs:             10.8<L>  11.74<H> )-----------( 299      ( 10-09 @ 05:50 )             35.5         MEDICATIONS  (STANDING):  acetaminophen   Tablet .. 975 milliGRAM(s) Oral every 6 hours  enoxaparin Injectable 60 milliGRAM(s) SubCutaneous daily  influenza   Vaccine 0.5 milliLiter(s) IntraMuscular once  ketorolac   Injectable 30 milliGRAM(s) IV Push every 6 hours  lactated ringers. 1000 milliLiter(s) (30 mL/Hr) IV Continuous <Continuous>  lactated ringers. 1000 milliLiter(s) (125 mL/Hr) IV Continuous <Continuous>    MEDICATIONS  (PRN):  ALBUTerol    90 MICROgram(s) HFA Inhaler 2 Puff(s) Inhalation every 6 hours PRN Shortness of Breath and/or Wheezing  oxyCODONE    IR 5 milliGRAM(s) Oral every 6 hours PRN Severe Pain (7 - 10)

## 2020-10-09 NOTE — PROGRESS NOTE ADULT - ASSESSMENT
A/P: POD1 s/p WILLIAMS, BS, extensive MARY involving bowel, uterus, ovaries with gen Sx and Gyn onc intraop consult.  patient is doing well postop. No events over night.   - advance diet as tolerated  - d/c Michael per orders  - f/u labs  - cont DVT prophylaxis  - postop pain mgmt  - OOB as tolerated  MD coy

## 2020-10-10 DIAGNOSIS — D62 ACUTE POSTHEMORRHAGIC ANEMIA: ICD-10-CM

## 2020-10-10 PROCEDURE — 99232 SBSQ HOSP IP/OBS MODERATE 35: CPT

## 2020-10-10 RX ADMIN — Medication 600 MILLIGRAM(S): at 18:00

## 2020-10-10 RX ADMIN — ENOXAPARIN SODIUM 60 MILLIGRAM(S): 100 INJECTION SUBCUTANEOUS at 11:27

## 2020-10-10 RX ADMIN — Medication 975 MILLIGRAM(S): at 17:16

## 2020-10-10 RX ADMIN — Medication 975 MILLIGRAM(S): at 11:28

## 2020-10-10 RX ADMIN — Medication 600 MILLIGRAM(S): at 05:59

## 2020-10-10 RX ADMIN — Medication 975 MILLIGRAM(S): at 18:00

## 2020-10-10 RX ADMIN — Medication 975 MILLIGRAM(S): at 05:59

## 2020-10-10 RX ADMIN — Medication 600 MILLIGRAM(S): at 06:59

## 2020-10-10 RX ADMIN — Medication 1 SPRAY(S): at 17:15

## 2020-10-10 RX ADMIN — Medication 975 MILLIGRAM(S): at 00:04

## 2020-10-10 RX ADMIN — Medication 1 SPRAY(S): at 06:00

## 2020-10-10 RX ADMIN — Medication 975 MILLIGRAM(S): at 06:59

## 2020-10-10 RX ADMIN — OXYCODONE HYDROCHLORIDE 5 MILLIGRAM(S): 5 TABLET ORAL at 14:30

## 2020-10-10 RX ADMIN — OXYCODONE HYDROCHLORIDE 5 MILLIGRAM(S): 5 TABLET ORAL at 13:44

## 2020-10-10 RX ADMIN — Medication 975 MILLIGRAM(S): at 23:47

## 2020-10-10 RX ADMIN — Medication 600 MILLIGRAM(S): at 17:15

## 2020-10-10 RX ADMIN — MONTELUKAST 10 MILLIGRAM(S): 4 TABLET, CHEWABLE ORAL at 11:27

## 2020-10-10 NOTE — PROGRESS NOTE ADULT - ATTENDING COMMENTS
Note reviewed. labs and vitals reviewed.  POD 2 s/p WILLIAMS, BS, MARY, with intraop sx and gyn onc consult. Patient is recovering well. Good pain control, voiding, ambulating and tolerating regular diet.   plan for home d/c once passed flatus.   MD coy

## 2020-10-10 NOTE — PROGRESS NOTE ADULT - SUBJECTIVE AND OBJECTIVE BOX
Patient is a 30y old  Female who presents with a chief complaint of s/p Total Abdominal Hysterectomy, BS, MARY    SUBJECTIVE / OVERNIGHT EVENTS:    Feels better today than yesterday, pain is less  No cough, CP, SOB, no symptoms of her asthma  Tolerating diet, voiding  Feels gassy, minimal flatus, no BM  Wants a uterus pillow (has lungs)  Attempting OOB as much as possible (currently in chair listening to music)    MEDICATIONS  (STANDING):  acetaminophen   Tablet 975 milliGRAM(s) Oral every 6 hours  enoxaparin Injectable 60 milliGRAM(s) SubCutaneous daily  fluticasone propionate 50 MICROgram(s)/spray Nasal Spray 1 Spray(s) Both Nostrils two times a day  influenza   Vaccine 0.5 milliLiter(s) IntraMuscular once  montelukast 10 milliGRAM(s) Oral daily    MEDICATIONS  (PRN):  ALBUTerol    90 MICROgram(s) HFA Inhaler 2 Puff(s) Inhalation every 6 hours PRN Shortness of Breath and/or Wheezing  ibuprofen  Tablet. 600 milliGRAM(s) Oral every 6 hours PRN Mild Pain (1 - 3), Moderate Pain (4 - 6)  oxyCODONE    IR 5 milliGRAM(s) Oral every 6 hours PRN Severe Pain (7 - 10)    T(C): 36.8 (10-10-20 @ 15:54), Max: 37.3 (10-09-20 @ 20:01)  HR: 93 (10-10-20 @ 15:54) (82 - 107)  BP: 113/79 (10-10-20 @ 15:54) (108/61 - 148/69)  RR: 19 (10-10-20 @ 15:54) (16 - 19)  SpO2: 100% (10-10-20 @ 15:54) (98% - 100%)    CAPILLARY BLOOD GLUCOSE          I&O's Summary    09 Oct 2020 07:01  -  10 Oct 2020 07:00  --------------------------------------------------------  IN: 370 mL / OUT: 2500 mL / NET: -2130 mL    10 Oct 2020 07:01  -  10 Oct 2020 16:07  --------------------------------------------------------  IN: 480 mL / OUT: 600 mL / NET: -120 mL    PHYSICAL EXAM:  GENERAL: NAD, well-developed  CHEST/LUNG: Clear to auscultation bilaterally; No wheeze  HEART: Regular rate and rhythm; No murmurs, rubs, or gallops  ABDOMEN: Soft, Nontender, Nondistended; decreased BS; lower umbilical incision c/d/i  EXTREMITIES:   warm and well perfused, No clubbing, cyanosis, or edema  PSYCH: AAOx3  NEUROLOGY: non-focal  SKIN: No rashes or lesions    LABS:                        10.8   11.74 )-----------( 299      ( 09 Oct 2020 05:50 )             35.5     10-09    137  |  104  |  6<L>  ----------------------------<  123<H>  4.7   |  26  |  0.82    Ca    8.2<L>      09 Oct 2020 05:50     Notes Reviewed:  gyn   Care Discussed with Consultants/Other Providers: gyn

## 2020-10-10 NOTE — PROGRESS NOTE ADULT - SUBJECTIVE AND OBJECTIVE BOX
GYN Progress Note        POD#2   HD#3    Patient seen and examined at bedside.  No acute events overnight. No acute complaints.  Pain well controlled.  Patient is ambulating and tolerating regular diet.  Has not yet passed flatus.  Patient is voiding spontaneously.  Denies CP, SOB, N/V, fevers, and chills.    Vital Signs Last 24 Hours  T(C): 36.6 (10-10-20 @ 08:21), Max: 37.3 (10-09-20 @ 20:01)  HR: 85 (10-10-20 @ 08:21) (85 - 107)  BP: 108/61 (10-10-20 @ 08:21) (108/61 - 148/69)  RR: 16 (10-10-20 @ 08:21) (16 - 18)  SpO2: 100% (10-10-20 @ 08:21) (99% - 100%)    I&O's Summary    09 Oct 2020 07:01  -  10 Oct 2020 07:00  --------------------------------------------------------  IN: 370 mL / OUT: 2500 mL / NET: -2130 mL    10 Oct 2020 07:01  -  10 Oct 2020 11:53  --------------------------------------------------------  IN: 480 mL / OUT: 600 mL / NET: -120 mL        Physical Exam:  General: NAD  CV: NR, RR, S1, S2, no M/R/G  Lungs: CTA-B  Abdomen: Soft, non-tender, non-distended. No rebound or guarding  Incision: midline vertical C/D/I, steris in place  Ext: No pain or swelling    Labs:                        10.8   11.74 )-----------( 299      ( 09 Oct 2020 05:50 )             35.5   baso x      eos x      imm gran x      lymph x      mono x      poly x          MEDICATIONS  (STANDING):  acetaminophen   Tablet .. 975 milliGRAM(s) Oral every 6 hours  enoxaparin Injectable 60 milliGRAM(s) SubCutaneous daily  fluticasone propionate 50 MICROgram(s)/spray Nasal Spray 1 Spray(s) Both Nostrils two times a day  influenza   Vaccine 0.5 milliLiter(s) IntraMuscular once  montelukast 10 milliGRAM(s) Oral daily    MEDICATIONS  (PRN):  ALBUTerol    90 MICROgram(s) HFA Inhaler 2 Puff(s) Inhalation every 6 hours PRN Shortness of Breath and/or Wheezing  ibuprofen  Tablet. 600 milliGRAM(s) Oral every 6 hours PRN Mild Pain (1 - 3), Moderate Pain (4 - 6)  oxyCODONE    IR 5 milliGRAM(s) Oral every 6 hours PRN Severe Pain (7 - 10)      A/P: 30y POD#__ s/p __.  Patient is stable and doing well.      Neuro: PO pain meds.   CV: Hemodynamically stable  Pulm: Saturating well on room air, encourage oob/amb  GI: Advance to regular diet vs. Continue regular diet  : UOP adequate, d/c [place]   vs. Voiding spontanously   Heme: c/w HSQ and SCDs for DVT ppx  FEN: LR@125.  replete electrolytes prn   ID: Afebrile  Endo: No active issues   Dispo: Continue routine post-op care    Shay Antoine, PGY1 GYN Progress Note        POD#2   HD#3    Patient seen and examined at bedside.  No acute events overnight. No acute complaints.  Pain well controlled.  Patient is ambulating and tolerating regular diet.  Has not yet passed flatus.  Patient is voiding spontaneously.  Denies CP, SOB, N/V, fevers, and chills.    Vital Signs Last 24 Hours  T(C): 36.6 (10-10-20 @ 08:21), Max: 37.3 (10-09-20 @ 20:01)  HR: 85 (10-10-20 @ 08:21) (85 - 107)  BP: 108/61 (10-10-20 @ 08:21) (108/61 - 148/69)  RR: 16 (10-10-20 @ 08:21) (16 - 18)  SpO2: 100% (10-10-20 @ 08:21) (99% - 100%)    I&O's Summary    09 Oct 2020 07:01  -  10 Oct 2020 07:00  --------------------------------------------------------  IN: 370 mL / OUT: 2500 mL / NET: -2130 mL    10 Oct 2020 07:01  -  10 Oct 2020 11:53  --------------------------------------------------------  IN: 480 mL / OUT: 600 mL / NET: -120 mL        Physical Exam:  General: NAD  CV: NR, RR, S1, S2, no M/R/G  Lungs: CTA-B  Abdomen: Soft, non-tender, non-distended. No rebound or guarding  Incision: midline vertical C/D/I, steris in place  Ext: No pain or swelling    Labs:                        10.8   11.74 )-----------( 299      ( 09 Oct 2020 05:50 )             35.5   baso x      eos x      imm gran x      lymph x      mono x      poly x          MEDICATIONS  (STANDING):  acetaminophen   Tablet .. 975 milliGRAM(s) Oral every 6 hours  enoxaparin Injectable 60 milliGRAM(s) SubCutaneous daily  fluticasone propionate 50 MICROgram(s)/spray Nasal Spray 1 Spray(s) Both Nostrils two times a day  influenza   Vaccine 0.5 milliLiter(s) IntraMuscular once  montelukast 10 milliGRAM(s) Oral daily    MEDICATIONS  (PRN):  ALBUTerol    90 MICROgram(s) HFA Inhaler 2 Puff(s) Inhalation every 6 hours PRN Shortness of Breath and/or Wheezing  ibuprofen  Tablet. 600 milliGRAM(s) Oral every 6 hours PRN Mild Pain (1 - 3), Moderate Pain (4 - 6)  oxyCODONE    IR 5 milliGRAM(s) Oral every 6 hours PRN Severe Pain (7 - 10)

## 2020-10-10 NOTE — PROGRESS NOTE ADULT - ASSESSMENT
29 yo F with asthma, PE (3/2014 thought to be 2/2 birth control), fibroids complicated by heavy menstruation s/p Total Abdominal Hysterectomy, BS, MARY on 10/8

## 2020-10-10 NOTE — PROGRESS NOTE ADULT - PROBLEM SELECTOR PLAN 1
POD #2 for Total Abdominal Hysterectomy, BS, MARY. General Surgery and GYN ONC called in to assist with extensive lysis of adhesions. Colon oversewn in 2 locations  - Care per primary team   - Diet advanced to regular, voiding   - minimal flatus, no BM  - Pain control  - Encourage OOB, incentive spirometry

## 2020-10-10 NOTE — PROGRESS NOTE ADULT - PROBLEM SELECTOR PLAN 4
Diagnosed in 3/2014, thought to be provoked in setting of birth controlled. Treated with AC x1 year, now off  - c/w ppx dose lovenox, encourage ambulation - fluticasone

## 2020-10-10 NOTE — PROGRESS NOTE ADULT - PROBLEM SELECTOR PLAN 5
BMI 41  Lovenox 60 mg ppx  dispo per primary team Diagnosed in 3/2014, thought to be provoked in setting of birth controlled. Treated with AC x1 year, now off  - c/w ppx dose lovenox, encourage ambulation

## 2020-10-10 NOTE — PROGRESS NOTE ADULT - ASSESSMENT
A/P: 29yo POD2 s/p Total Abdominal Hysterectomy, BS, MARY, blocks with GYN ONC and General Surgery. Hemodynamically stable and doing well.    Neuro: c/w po pain meds: motrin, tylenol, oxycodone prn  CV: Hemodynamically stable  Pulm: Saturating well on room air, encourage oob/amb  GI: c/w Reg diet  : voiding spontaneously  Heme: c/w lovenox and SCDs for DVT ppx

## 2020-10-10 NOTE — PROGRESS NOTE ADULT - PROBLEM SELECTOR PLAN 2
No s/s of asthma exacerbation   - Restart home montelukast  - C/w albuterol PRN 12.3 to 10.8, asymptomatic, no s/s of bleeding, due to expected OR loss  - monitor, transfuse <7, active bleeding or symptoms

## 2020-10-11 ENCOUNTER — TRANSCRIPTION ENCOUNTER (OUTPATIENT)
Age: 30
End: 2020-10-11

## 2020-10-11 VITALS
RESPIRATION RATE: 19 BRPM | OXYGEN SATURATION: 98 % | HEART RATE: 96 BPM | TEMPERATURE: 98 F | SYSTOLIC BLOOD PRESSURE: 129 MMHG | DIASTOLIC BLOOD PRESSURE: 81 MMHG

## 2020-10-11 PROCEDURE — 99232 SBSQ HOSP IP/OBS MODERATE 35: CPT

## 2020-10-11 RX ADMIN — Medication 975 MILLIGRAM(S): at 05:57

## 2020-10-11 RX ADMIN — OXYCODONE HYDROCHLORIDE 5 MILLIGRAM(S): 5 TABLET ORAL at 00:59

## 2020-10-11 RX ADMIN — OXYCODONE HYDROCHLORIDE 5 MILLIGRAM(S): 5 TABLET ORAL at 02:31

## 2020-10-11 RX ADMIN — Medication 975 MILLIGRAM(S): at 11:01

## 2020-10-11 RX ADMIN — Medication 975 MILLIGRAM(S): at 06:46

## 2020-10-11 RX ADMIN — MONTELUKAST 10 MILLIGRAM(S): 4 TABLET, CHEWABLE ORAL at 11:01

## 2020-10-11 RX ADMIN — Medication 600 MILLIGRAM(S): at 10:53

## 2020-10-11 RX ADMIN — ENOXAPARIN SODIUM 60 MILLIGRAM(S): 100 INJECTION SUBCUTANEOUS at 11:02

## 2020-10-11 RX ADMIN — Medication 1 SPRAY(S): at 05:57

## 2020-10-11 RX ADMIN — Medication 975 MILLIGRAM(S): at 00:58

## 2020-10-11 NOTE — PROGRESS NOTE ADULT - ASSESSMENT
31 yo F with asthma, PE (3/2014 thought to be 2/2 birth control), fibroids complicated by heavy menstruation s/p Total Abdominal Hysterectomy, BS, MARY on 10/8

## 2020-10-11 NOTE — PROGRESS NOTE ADULT - ATTENDING COMMENTS
I have seen and evaluated patient later today  agreed with resident note  patient doing well,ambulating,passing flatus and have BM  INCISION C/D/I  DC home   fup with dr chao in one week

## 2020-10-11 NOTE — PROGRESS NOTE ADULT - SUBJECTIVE AND OBJECTIVE BOX
GYN Progress Note        POD#3   HD#4    Patient seen and examined at bedside.  No acute events overnight. No acute complaints.  Pain well controlled.  Patient is ambulating and tolerating regular diet.  Patient is passing flatus and had a bowel movement yesterday.  Patient is voiding spontaneously.  Denies CP, SOB, N/V, fevers, and chills.    Vital Signs Last 24 Hours  T(C): 36.6 (10-11-20 @ 05:54), Max: 37 (10-10-20 @ 20:19)  HR: 80 (10-11-20 @ 05:54) (80 - 93)  BP: 120/66 (10-11-20 @ 05:54) (113/79 - 128/81)  RR: 20 (10-11-20 @ 05:54) (19 - 20)  SpO2: 98% (10-11-20 @ 05:54) (98% - 100%)    I&O's Summary    10 Oct 2020 07:01  -  11 Oct 2020 07:00  --------------------------------------------------------  IN: 480 mL / OUT: 2000 mL / NET: -1520 mL        Physical Exam:  General: NAD  CV: NR, RR, S1, S2, no M/R/G  Lungs: CTA-B  Abdomen: Soft, non-tender, non-distended. No rebound or guarding  Incision: midline vertical C/D/I, steris in place  Ext: No pain or swelling    Labs:                        10.8   11.74 )-----------( 299      ( 09 Oct 2020 05:50 )             35.5   baso x      eos x      imm gran x      lymph x      mono x      poly x          MEDICATIONS  (STANDING):  acetaminophen   Tablet .. 975 milliGRAM(s) Oral every 6 hours  enoxaparin Injectable 60 milliGRAM(s) SubCutaneous daily  fluticasone propionate 50 MICROgram(s)/spray Nasal Spray 1 Spray(s) Both Nostrils two times a day  influenza   Vaccine 0.5 milliLiter(s) IntraMuscular once  montelukast 10 milliGRAM(s) Oral daily    MEDICATIONS  (PRN):  ALBUTerol    90 MICROgram(s) HFA Inhaler 2 Puff(s) Inhalation every 6 hours PRN Shortness of Breath and/or Wheezing  ibuprofen  Tablet. 600 milliGRAM(s) Oral every 6 hours PRN Mild Pain (1 - 3), Moderate Pain (4 - 6)  oxyCODONE    IR 5 milliGRAM(s) Oral every 6 hours PRN Severe Pain (7 - 10)

## 2020-10-11 NOTE — PROGRESS NOTE ADULT - PROBLEM SELECTOR PLAN 2
12.3 to 10.8, asymptomatic, no s/s of bleeding, due to expected OR loss  - monitor, transfuse <7, active bleeding or symptoms

## 2020-10-11 NOTE — DISCHARGE NOTE NURSING/CASE MANAGEMENT/SOCIAL WORK - NSDCPNINST_GEN_ALL_CORE
Call 911 for chest pain, and/or difficulty breathing. Report to your doctor any fever, pus/increased redness/increased swelling to your incision.

## 2020-10-11 NOTE — PROGRESS NOTE ADULT - ASSESSMENT
A/P: 29yo POD3 s/p Total Abdominal Hysterectomy, BS, MARY, blocks with GYN ONC and General Surgery. Hemodynamically stable and doing well.    Neuro: c/w po pain meds: motrin, tylenol, oxycodone prn  CV: Hemodynamically stable  Pulm: Saturating well on room air, encourage oob/amb  GI: c/w Reg diet  : voiding spontaneously  Heme: c/w lovenox and SCDs for DVT ppx  Dispo: discharge planning    Shay Antoine PGY2

## 2020-10-11 NOTE — PROGRESS NOTE ADULT - PROBLEM SELECTOR PLAN 5
Diagnosed in 3/2014, thought to be provoked in setting of birth controlled. Treated with AC x1 year, now off  - c/w ppx dose Lovenox, encourage ambulation

## 2020-10-11 NOTE — PROGRESS NOTE ADULT - SUBJECTIVE AND OBJECTIVE BOX
Patient is a 30y old  Female who presents with a chief complaint of s/p Total Abdominal Hysterectomy, BS, MARY    SUBJECTIVE / OVERNIGHT EVENTS:    Feeling much improved today  Pain is well controlled, ambulating, passing gas, had BM yesterday  Tolerating diet  Excited to be going home  asking for abdominal binder     MEDICATIONS  (STANDING):  acetaminophen   Tablet .. 975 milliGRAM(s) Oral every 6 hours  enoxaparin Injectable 60 milliGRAM(s) SubCutaneous daily  fluticasone propionate 50 MICROgram(s)/spray Nasal Spray 1 Spray(s) Both Nostrils two times a day  influenza   Vaccine 0.5 milliLiter(s) IntraMuscular once  montelukast 10 milliGRAM(s) Oral daily    MEDICATIONS  (PRN):  ALBUTerol    90 MICROgram(s) HFA Inhaler 2 Puff(s) Inhalation every 6 hours PRN Shortness of Breath and/or Wheezing  ibuprofen  Tablet. 600 milliGRAM(s) Oral every 6 hours PRN Mild Pain (1 - 3), Moderate Pain (4 - 6)  oxyCODONE    IR 5 milliGRAM(s) Oral every 6 hours PRN Severe Pain (7 - 10)    T(C): 36.8 (10-11-20 @ 10:57), Max: 37 (10-10-20 @ 20:19)  HR: 96 (10-11-20 @ 10:57) (80 - 96)  BP: 129/81 (10-11-20 @ 10:57) (120/66 - 129/81)  RR: 19 (10-11-20 @ 10:57) (19 - 20)  SpO2: 98% (10-11-20 @ 10:57) (98% - 100%)    I&O's Summary    10 Oct 2020 07:01  -  11 Oct 2020 07:00  --------------------------------------------------------  IN: 480 mL / OUT: 2000 mL / NET: -1520 mL    11 Oct 2020 07:01  -  11 Oct 2020 16:14  --------------------------------------------------------  IN: 0 mL / OUT: 800 mL / NET: -800 mL    PHYSICAL EXAM:  GENERAL: NAD, well-developed  CHEST/LUNG: Clear to auscultation bilaterally; No wheeze  HEART: Regular rate and rhythm; No murmurs, rubs, or gallops  ABDOMEN: Soft, Nontender, Nondistended; decreased BS; lower umbilical incision c/d/i  EXTREMITIES:   warm and well perfused, No clubbing, cyanosis, or edema  PSYCH: AAOx3  NEUROLOGY: non-focal  SKIN: No rashes or lesions    LABS: no new labs    Consultant(s) Notes Reviewed:   gyn   Care Discussed with Consultants/Other Providers: gyn

## 2020-10-11 NOTE — DISCHARGE NOTE NURSING/CASE MANAGEMENT/SOCIAL WORK - PATIENT PORTAL LINK FT
You can access the FollowMyHealth Patient Portal offered by [facility] by registering at the following website: [email or web address]. By joining [employer]’s FollowMyHealth portal, you will also be able to view your health information using other applications (apps) compatible with our system.

## 2020-10-11 NOTE — PROGRESS NOTE ADULT - PROBLEM SELECTOR PLAN 1
POD #3 for Total Abdominal Hysterectomy, BS, MARY. General Surgery and GYN ONC called in to assist with extensive lysis of adhesions. Colon oversewn in 2 locations  - Care per primary team   - Diet advanced to regular, voiding, having BMs and passing flatus  - Pain well controlled  - Encourage OOB, incentive spirometry

## 2020-10-19 LAB — SURGICAL PATHOLOGY STUDY: SIGNIFICANT CHANGE UP
